# Patient Record
Sex: MALE | Race: WHITE
[De-identification: names, ages, dates, MRNs, and addresses within clinical notes are randomized per-mention and may not be internally consistent; named-entity substitution may affect disease eponyms.]

---

## 2019-10-14 ENCOUNTER — HOSPITAL ENCOUNTER (EMERGENCY)
Dept: HOSPITAL 25 - ED | Age: 24
Discharge: HOME | End: 2019-10-14
Payer: COMMERCIAL

## 2019-10-14 VITALS — DIASTOLIC BLOOD PRESSURE: 63 MMHG | SYSTOLIC BLOOD PRESSURE: 111 MMHG

## 2019-10-14 DIAGNOSIS — S61.217A: ICD-10-CM

## 2019-10-14 DIAGNOSIS — S61.215A: Primary | ICD-10-CM

## 2019-10-14 DIAGNOSIS — S61.012A: ICD-10-CM
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
## 2019-10-14 PROCEDURE — 12001 RPR S/N/AX/GEN/TRNK 2.5CM/<: CPT

## 2019-10-14 PROCEDURE — 99282 EMERGENCY DEPT VISIT SF MDM: CPT

## 2019-11-17 ENCOUNTER — HOSPITAL ENCOUNTER (EMERGENCY)
Dept: HOSPITAL 25 - ED | Age: 24
Discharge: HOME | End: 2019-11-17
Payer: COMMERCIAL

## 2019-11-17 VITALS — DIASTOLIC BLOOD PRESSURE: 77 MMHG | SYSTOLIC BLOOD PRESSURE: 131 MMHG

## 2019-11-17 DIAGNOSIS — S01.01XA: ICD-10-CM
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
## 2019-11-17 DIAGNOSIS — S06.0X1A: Primary | ICD-10-CM

## 2019-11-17 PROCEDURE — 99283 EMERGENCY DEPT VISIT LOW MDM: CPT

## 2019-11-17 PROCEDURE — 12001 RPR S/N/AX/GEN/TRNK 2.5CM/<: CPT
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
## 2022-01-19 NOTE — ED
----- Message from CHARLIE Diego - NP Laceration/Wound HPI





- HPI Summary


HPI Summary: 


Patient is a 23 y/o M presenting to [facility] with multiple lacerations to fingers 

of the left hand. He states that around 0000 10/14/19, he tripped and smashed 

his left hand through a window pane. He denies foreign body sensation. Patient 

had washed out the lacerations at home. He denies PMHx of HTN and diabetes, PSHx

, and FMHx of HTN and diabetes. On triage, pain is rated 3/10, nothing is noted 

to aggravate/alleviate Sx. Home medications and allergies are reviewed.





- History of Current Complaint


Stated Complaint: HAND LAC PER PT


Time Seen by Provider: 10/14/19 02:00


Hx Obtained From: Patient


Mechanism of Injury: Sharp/Blunt Trauma


Onset/Duration: Still Present


Aggravating: Nothing


Alleviating: Nothing


Current Severity: Mild


Pain Intensity: 3


Pain Scale Used: 0-10 Numeric


Associated Signs & Symptoms: Negative





- Allergy/Home Medications


Allergies/Adverse Reactions: 


 Allergies











Allergy/AdvReac Type Severity Reaction Status Date / Time


 


No Known Allergies Allergy   Verified 10/14/19 01:14














PMH/Surg Hx/FS Hx/Imm Hx


Endocrine/Hematology History: 


   Denies: Hx Diabetes


Cardiovascular History: 


   Denies: Hx Hypertension





- Surgical History


Surgery Procedure, Year, and Place: none


Infectious Disease History: No


Infectious Disease History: 


   Denies: Traveled Outside the [place] in Last 30 Days





- Family History


Known Family History: 


   Negative: Hypertension, Diabetes





- Social History


Alcohol Use: Weekly


Substance Use Type: Reports: None


Smoking Status (MU): Never Smoked Tobacco





Review of Systems


Negative: Fever - on vitals, temp is 98.4 F 


Skin: Other - positive - left hand lacerations 


All Other Systems Reviewed And Are Negative: Yes





Physical Exam





- Summary


Physical Exam Summary: 





Appearance: Well-appearing, Well-nourished, lying in bed comfortably


Skin: Patient had superficial lacerations to the left thumb and left, fifth 

finger. He additionally had a deeper laceration to his left, fourth finger. 

Lacerations are at the dorsal aspect of the hand. 


Eyes: sclera anicteric, no conjunctival pallor


ENT: mucous membranes moist, pharynx appears normal


Neck: Supple, nontender


Respiratory: Clear to auscultation, no signs of respiratory distress


Cardiovascular: Normal S1, S2. No murmurs. Normal distal pulses in tibial and 

radial bilaterally.


Abdomen: Soft, nontender, normal active bowel sounds present


Musculoskeletal: Normal, Strength/ROM Intact


Neurological: A&Ox3, awake and alert, mentation is normal, speech is fluent and 

appropriate


Psychiatric: affect is normal, does not appear anxious or depressed





Triage Information Reviewed: Yes


Vital Signs On Initial Exam: 


 Initial Vitals











Temp Pulse Resp BP Pulse Ox


 


 98.4 F   77   15   119/57   99 


 


 10/14/19 01:12  10/14/19 01:12  10/14/19 01:12  10/14/19 01:12  10/14/19 01:12











Vital Signs Reviewed: Yes





Procedures





- Procedure Summary


Procedure Summary: 


Lacerations were cleaned with warm soap water. Linear laceration of the fourth 

finger of the left hand was repaired. Digital block, 2% lidocaine with epi was 

used. Laceration was closed in single layer using 3, 5-0 Nylon sutures. 

Lacerations to the left thumb and left fifth finger were closed using skin 

adhesive. 





- Sedation


Patient Received Moderate/Deep Sedation with Procedure: No





- Laceration/Wound Repair


  ** 1


Location: Other - 4th finger of left hand 


Description: Linear


Anesthesia: Digital, 2.0%, Lido, Epi


Laceration/Wound Explored: clean


Closure: Single Layer


Suture Type: Nylon - 3 5-0 Nylon sutures  


Layer Closure?: Yes


Sterile Dressing Applied?: Yes





  ** 2


Location: Other - left fifth finger


Description: Linear


Closure: Skin Adhesive





  ** 3


Location: Other - left thumb 


Description: Linear


Closure: Skin Adhesive





Diagnostics





- Vital Signs


 Vital Signs











  Temp Pulse Resp BP Pulse Ox


 


 10/14/19 01:12  98.4 F  77  15  119/57  99














- Laboratory


Lab Statement: Any lab studies that have been ordered have been reviewed, and 

results considered in the medical decision making process.





Laceration Repair Course/Dx





- Course


Course Of Treatment: Patient is a 23 y/o M presenting to [facility] with multiple 

lacerations to fingers of the left hand. He states that around 0000 10/14/19, 

he tripped and smashed his left hand through a window pane. He denies foreign 

body sensation. Patient had washed out the lacerations at home. Patient had 

superficial lacerations to the left thumb and left, fifth finger. He 

additionally had a deeper laceration to his left, fourth finger. Lacerations 

are at the dorsal aspect of the hand. Lacerations were cleaned with warm soap 

water. Linear laceration of the fourth finger of the left hand was repaired. 

Digital block, 2% lidocaine with epi was used. Laceration was closed in single 

layer using 3, 5-0 Nylon sutures. Lacerations to the left thumb and left fifth 

finger were closed using skin adhesive. Patient was discharged to home.





- Clinical Impression


Provider Diagnoses: 


 Laceration of multiple sites of left hand and fingers








Discharge ED





- Sign-Out/Discharge


Documenting (check all that apply): Patient Departure - discharge





- Discharge Plan


Condition: Stable


Disposition: HOME


Patient Education Materials:  Care For Your Stitches (ED), Laceration (ED)


Referrals: 


[facility] [Outside]


Additional Instructions: 


The sutures need to be removed in 7 days





- Billing Disposition and Condition


Condition: STABLE


Disposition: Home





- Attestation Statements


Document Initiated by Kervine: Yes


Documenting Scribe: CANDIDO JI


Provider For Whom Kervine is Documenting (Include Credential): MARGIE MATTHEWS MD


Scribe Attestation: 


CANDIDO LOBO, scribed for MARGIE MATTHEWS MD on 10/15/19 at 0329. 


Scribe Documentation Reviewed: Yes


Provider Attestation: 


The documentation as recorded by the CANDIDO iglesias accurately reflects 

the service I personally performed and the decisions made by me, MARGIE MATTHEWS MD


Status of Scribe Document: Viewed